# Patient Record
Sex: MALE | HISPANIC OR LATINO | Employment: FULL TIME | URBAN - NONMETROPOLITAN AREA
[De-identification: names, ages, dates, MRNs, and addresses within clinical notes are randomized per-mention and may not be internally consistent; named-entity substitution may affect disease eponyms.]

---

## 2017-05-05 ENCOUNTER — OFFICE VISIT (OUTPATIENT)
Dept: URGENT CARE | Facility: PHYSICIAN GROUP | Age: 47
End: 2017-05-05
Payer: MEDICAID

## 2017-05-05 VITALS
HEART RATE: 76 BPM | TEMPERATURE: 97.8 F | OXYGEN SATURATION: 95 % | DIASTOLIC BLOOD PRESSURE: 84 MMHG | WEIGHT: 258 LBS | HEIGHT: 71 IN | SYSTOLIC BLOOD PRESSURE: 142 MMHG | RESPIRATION RATE: 14 BRPM | BODY MASS INDEX: 36.12 KG/M2

## 2017-05-05 DIAGNOSIS — S39.012A STRAIN OF LUMBAR PARASPINOUS MUSCLE, INITIAL ENCOUNTER: ICD-10-CM

## 2017-05-05 DIAGNOSIS — M54.16 LUMBAR RADICULOPATHY: ICD-10-CM

## 2017-05-05 PROCEDURE — 99214 OFFICE O/P EST MOD 30 MIN: CPT | Performed by: PHYSICIAN ASSISTANT

## 2017-05-05 RX ORDER — IBUPROFEN 800 MG/1
800 TABLET ORAL EVERY 8 HOURS PRN
Qty: 30 TAB | Refills: 0 | Status: SHIPPED | OUTPATIENT
Start: 2017-05-05

## 2017-05-05 NOTE — MR AVS SNAPSHOT
"        Barney Suarezral   2017 5:25 PM   Office Visit   MRN: [id number]    Department:  [facility]   Dept Phone:  [phone]    Description:  Male : 1970   Provider:  Aspen Mcneil PA-C           Reason for Visit     Groin Pain rt leg      Allergies as of 2017     No Known Allergies      You were diagnosed with     Lumbar radiculopathy   [213332]       Strain of lumbar paraspinous muscle, initial encounter   [436497]         Vital Signs     Blood Pressure Pulse Temperature Respirations Height Weight    142/84 mmHg 76 36.6 °C (97.8 °F) 14 1.803 m (5' 11\") 117.028 kg (258 lb)    Body Mass Index Oxygen Saturation Smoking Status             36.00 kg/m2 95% Former Smoker         Basic Information     Date Of Birth Sex Race Ethnicity Preferred Language    1970 Male  or   Origin (Amharic,Rwandan,Citizen of [place], etc) English      Problem List              ICD-10-CM Priority Class Noted - Resolved    Coronary atherosclerosis of native coronary artery I25.10 High  2013 - Present    Mixed hyperlipidemia E78.2 High  2013 - Present    Obesity E66.9 Low  2013 - Present    Hypertension I10 High  3/11/2013 - Present    DM type 2 (diabetes mellitus, type 2) (CMS-HCC) E11.9   2014 - Present    GERD (gastroesophageal reflux disease) K21.9   2014 - Present    S/P CABG x 3 Z95.1   2014 - Present    Fatigue R53.83   2014 - Present      Health Maintenance        Date Due Completion Dates    IMM HEP B VACCINE (1 of 3 - Primary Series) 1970 ---    DIABETES MONOFILAMENT / LE EXAM 1970 ---    URINE ACR / MICROALBUMIN 1989 ---    IMM DTaP/Tdap/Td Vaccine (1 - Tdap) 1990 ---    RETINAL SCREENING 2013 (Done)    Override on 2012: Done    A1C SCREENING 2013    FASTING LIPID PROFILE 2014    SERUM CREATININE 3/19/2015 3/19/2014, 2013, 2013, 2013            Current Immunizations    "    Influenza TIV (IM) 9/1/2012    Pneumococcal polysaccharide vaccine (PPSV-23) 9/1/2012      Below and/or attached are the medications your provider expects you to take. Review all of your home medications and newly ordered medications with your provider and/or pharmacist. Follow medication instructions as directed by your provider and/or pharmacist. Please keep your medication list with you and share with your provider. Update the information when medications are discontinued, doses are changed, or new medications (including over-the-counter products) are added; and carry medication information at all times in the event of emergency situations     Allergies:  No Known Allergies          Medications  Valid as of: May 05, 2017 -  6:02 PM    Generic Name Brand Name Tablet Size Instructions for use    Aspirin (Chew Tab) ASA 81 MG Take 1 Tab by mouth every day.        Atorvastatin Calcium (Tab) LIPITOR 20 MG Take 1 Tab by mouth every evening. Needs to be seen for further refills. Thank you        Erythromycin (Ointment) erythromycin 5 MG/GM Place 1 cm in right eye 2 times a day.        Glimepiride (Tab) AMARYL 1 MG Take 1 Tab by mouth every morning.        Ibuprofen (Tab) MOTRIN 800 MG Take 1 Tab by mouth every 8 hours as needed for Mild Pain or Moderate Pain.        Lisinopril (Tab) PRINIVIL 10 MG Take 1 Tab by mouth 2 times a day.        MetFORMIN HCl (Tab) GLUCOPHAGE 500 MG Take 1 Tab by mouth 2 times a day, with meals.        Metoprolol Tartrate (Tab) LOPRESSOR 25 MG Take 1 Tab by mouth 2 Times a Day.        Omeprazole (CAPSULE DELAYED RELEASE) PRILOSEC 20 MG Take 1 Cap by mouth every day.        .                 Medicines prescribed today were sent to:     [id number] - [place], [place] - [address] [place]    [address] [place] [address]    Phone: [phone] Fax: [phone]    Open 24 Hours?: No      Medication refill instructions:       If your  prescription bottle indicates you have medication refills left, it is not necessary to call your provider’s office. Please contact your pharmacy and they will refill your medication.    If your prescription bottle indicates you do not have any refills left, you may request refills at any time through one of the following ways: The online FitStar system (except Urgent Care), by calling your provider’s office, or by asking your pharmacy to contact your provider’s office with a refill request. Medication refills are processed only during regular business hours and may not be available until the next business day. Your provider may request additional information or to have a follow-up visit with you prior to refilling your medication.   *Please Note: Medication refills are assigned a new Rx number when refilled electronically. Your pharmacy may indicate that no refills were authorized even though a new prescription for the same medication is available at the pharmacy. Please request the medicine by name with the pharmacy before contacting your provider for a refill.           FitStar Access Code: [email or web address]  Expires: 6/4/2017  6:02 PM    Your email address is not on file at [email or web address].  Email Addresses are required for you to sign up for FitStar, please contact [phone] to verify your personal information and to provide your email address prior to attempting to register for FitStar.    Barney De Souza  [address] [place] [address]    FitStar  A secure, online tool to manage your health information     [facility]’s FitStar® is a secure, online tool that connects you to your personalized health information from the privacy of your home -- day or night - making it very easy for you to manage your healthcare. Once the activation process is completed, you can even access your medical information using the FitStar faith, which is available for free in the Apple Faith store or Google Play store.     To learn  more about [employer], visit [email or web address]    There are two levels of access available (as shown below):   My Chart Features  [facility]  Specialists [facility]  Urgent  Care Non-[facility] Doctor   Email your healthcare team securely and privately 24/7 X X X    Manage appointments: schedule your next appointment; view details of past/upcoming appointments X      Request prescription refills. X      View recent personal medical records, including lab and immunizations X X X X   View health record, including health history, allergies, medications X X X X   Read reports about your outpatient visits, procedures, consult and ER notes X X X X   See your discharge summary, which is a recap of your hospital and/or ER visit that includes your diagnosis, lab results, and care plan X X  X     How to register for Patient Communicator:  Once your e-mail address has been verified, follow the following steps to sign up for Patient Communicator.     1. Go to  [email or web address]  2. Click on the Sign Up Now box, which takes you to the New Member Sign Up page. You will need to provide the following information:  a. Enter your Patient Communicator Access Code exactly as it appears at the top of this page. (You will not need to use this code after you’ve completed the sign-up process. If you do not sign up before the expiration date, you must request a new code.)   b. Enter your date of birth.   c. Enter your home email address.   d. Click Submit, and follow the next screen’s instructions.  3. Create a Patient Communicator ID. This will be your Patient Communicator login ID and cannot be changed, so think of one that is secure and easy to remember.  4. Create a Patient Communicator password. You can change your password at any time.  5. Enter your Password Reset Question and Answer. This can be used at a later time if you forget your password.   6. Enter your e-mail address. This allows you to receive e-mail notifications when new information is available in  [employer].  7. Click Sign Up. You can now view your health information.    For assistance activating your [employer] account, call [phone]

## 2017-05-06 NOTE — PROGRESS NOTES
Chief Complaint   Patient presents with   • Groin Pain     rt leg       HISTORY OF PRESENT ILLNESS: Patient is a 46 y.o. male who presents today with his wife for evaluation of low back pain. He states the pain is radiating around his right hip and into the lateral, anterior, medial aspect of his right upper leg. He denies any testicular pain and swelling, changes in urination, and hematuria. He denies fever, nausea, vomiting, changes in his bowels. He denies any injury. He took over-the-counter ibuprofen with minimal relief of his symptoms.    Patient Active Problem List    Diagnosis Date Noted   • Hypertension 03/11/2013     Priority: High   • Coronary atherosclerosis of native coronary artery 02/18/2013     Priority: High   • Mixed hyperlipidemia 02/18/2013     Priority: High   • Obesity 02/18/2013     Priority: Low   • DM type 2 (diabetes mellitus, type 2) (CMS-[facility]) 05/01/2014   • GERD (gastroesophageal reflux disease) 05/01/2014   • S/P CABG x 3 05/01/2014   • Fatigue 05/01/2014       Allergies:Review of patient's allergies indicates no known allergies.    Current Outpatient Prescriptions Ordered in [facility]   Medication Sig Dispense Refill   • ibuprofen (MOTRIN) 800 MG Tab Take 1 Tab by mouth every 8 hours as needed for Mild Pain or Moderate Pain. 30 Tab 0   • erythromycin 5 MG/GM Ointment Place 1 cm in right eye 2 times a day. 1 Tube 0   • metoprolol (LOPRESSOR) 25 MG TABS Take 1 Tab by mouth 2 Times a Day. 60 Tab 11   • atorvastatin (LIPITOR) 20 MG TABS Take 1 Tab by mouth every evening. Needs to be seen for further refills. Thank you 30 Tab 1   • glimepiride (AMARYL) 1 MG tablet Take 1 Tab by mouth every morning. 30 Tab 3   • lisinopril (PRINIVIL) 10 MG TABS Take 1 Tab by mouth 2 times a day. 60 Tab 6   • metformin (GLUCOPHAGE) 500 MG TABS Take 1 Tab by mouth 2 times a day, with meals. 60 Tab 3   • omeprazole (PRILOSEC) 20 MG CPDR Take 1 Cap by mouth every day. 30 Cap 11   • aspirin (ASA) 81 MG CHEW Take 1 Tab  "by mouth every day. 100 Each      No current Epic-ordered facility-administered medications on file.       Past Medical History   Diagnosis Date   • Hypertension    • Diabetes    • Hyperlipidemia    • CAD (coronary artery disease) 2013     CABG x 3 (LIMA to ramus, SVG to first OM, SVG to RCA of PDA) by Dr. Shore.   • Mixed hyperlipidemia     • DIABETES MELLITUS     • Obesity         Social History   Substance Use Topics   • Smoking status: Former Smoker     Types: Cigarettes, Cigars     Quit date: 2013   • Smokeless tobacco: Never Used   • Alcohol Use: Yes      Comment: 12 pk per 2-4 weeks       Family Status   Relation Status Death Age   • Mother Alive    • Father Alive    • Paternal Grandfather       Family History   Problem Relation Age of Onset   • Thyroid Mother    • Cancer Mother      uterine cancer   • Hypertension Father    • Diabetes Brother    • Hypertension Brother    • Heart Disease Paternal Grandfather    • Heart Attack Paternal Grandfather        ROS:   Review of Systems   Constitutional: Negative for fever, chills, weight loss and malaise/fatigue.   HENT: Negative for ear pain, nosebleeds, congestion, sore throat and neck pain.    Eyes: Negative for blurred vision.   Respiratory: Negative for cough, sputum production, shortness of breath and wheezing.    Cardiovascular: Negative for chest pain, palpitations, orthopnea and leg swelling.   Gastrointestinal: Negative for heartburn, nausea, vomiting and abdominal pain.   Genitourinary: Negative for dysuria, urgency and frequency.       Exam:  Blood pressure 142/84, pulse 76, temperature 36.6 °C (97.8 °F), resp. rate 14, height 1.803 m (5' 11\"), weight 117.028 kg (258 lb), SpO2 95 %.  General: Normal appearing. No distress.  HEENT: Head is grossly normal.  Pulmonary: No respiratory distress noted.  Back: No localized tenderness noted. Full range of motion but pain with range of motion in all planes.  Neurologic: No sensory deficit " noted.  Extremities: No motor deficit noted. Prepatellar DTRs are strong and equal bilaterally.   Skin: No obvious lesions.  Psych: Normal mood. Alert and oriented x3. Judgment and insight is normal.    Assessment/Plan:  Discussed with patient that this is likely a muscle strain/spasm that is causing some nerve irritation. Discussed appropriate over-the-counter symptomatic medication, heat, and over-the-counter muscle rubs. Patient declines a muscle relaxer. Follow-up for worsening or persistent symptoms.  1. Lumbar radiculopathy  ibuprofen (MOTRIN) 800 MG Tab   2. Strain of lumbar paraspinous muscle, initial encounter